# Patient Record
Sex: FEMALE | URBAN - METROPOLITAN AREA
[De-identification: names, ages, dates, MRNs, and addresses within clinical notes are randomized per-mention and may not be internally consistent; named-entity substitution may affect disease eponyms.]

---

## 2019-10-07 ENCOUNTER — HOSPITAL ENCOUNTER (EMERGENCY)
Age: 34
Discharge: LEFT BEFORE DISCHARGE | End: 2019-10-07
Attending: EMERGENCY MEDICINE | Admitting: EMERGENCY MEDICINE

## 2019-10-07 VITALS — RESPIRATION RATE: 16 BRPM

## 2019-10-07 PROCEDURE — 75810000275 HC EMERGENCY DEPT VISIT NO LEVEL OF CARE

## 2023-01-05 NOTE — ED NOTES
Blood drawn with the patient and martine Rodriguez at bedside. Followed BLOOD SPECIEMEN COLLECTION INSTRUCTION provided by Martine. Removed all components from the kit container. Cleansed the blood collection site with the provided prep pad. Assured proper mixing of anticoagulant powder by slowly and completely inverting each blood vial severe times. Sealed each blood vial by placing a specimen seal over each blood vial stopper     Attached completed certificates of blood withdrawal to blood vials, two (2)    Placed each blood vial in the vial briggs and placed the briggs inside the ziplock bag. Placed the ziplock bag into the kit container    Had officer Asia Eng complete the DUI/D Submission Information Sheet and placed inside the kit container    Closed the kit container and sealed with tamper evident shipping seal provided. Officer took [place] of the specimen to be processed.
Evidence box sealed in front of the patient, and in front of Sandra Alford    Pt left the site via the ambulance entrance.
Patient arrived with blood draw with Sandra Grier at bedside. Patient reported no need to be assessed by a medical doctor for any complaints nor any distress.     Kaylie Romo MD made aware
0 = understands/communicates without difficulty